# Patient Record
Sex: FEMALE | Race: WHITE | Employment: FULL TIME
[De-identification: names, ages, dates, MRNs, and addresses within clinical notes are randomized per-mention and may not be internally consistent; named-entity substitution may affect disease eponyms.]

---

## 2017-01-27 ENCOUNTER — OFFICE VISIT (OUTPATIENT)
Dept: INTERNAL MEDICINE | Facility: CLINIC | Age: 62
End: 2017-01-27

## 2017-01-27 VITALS
HEART RATE: 100 BPM | DIASTOLIC BLOOD PRESSURE: 58 MMHG | SYSTOLIC BLOOD PRESSURE: 98 MMHG | BODY MASS INDEX: 30.19 KG/M2 | HEIGHT: 60 IN | TEMPERATURE: 97.8 F | RESPIRATION RATE: 18 BRPM | WEIGHT: 153.8 LBS

## 2017-01-27 DIAGNOSIS — K21.9 GASTROESOPHAGEAL REFLUX DISEASE WITHOUT ESOPHAGITIS: ICD-10-CM

## 2017-01-27 DIAGNOSIS — J32.9 CHRONIC SINUSITIS, UNSPECIFIED LOCATION: Primary | ICD-10-CM

## 2017-01-27 PROCEDURE — 99214 OFFICE O/P EST MOD 30 MIN: CPT | Performed by: NURSE PRACTITIONER

## 2017-01-27 RX ORDER — M-VIT,TX,IRON,MINS/CALC/FOLIC 27MG-0.4MG
1 TABLET ORAL DAILY
COMMUNITY

## 2017-01-27 RX ORDER — LORATADINE AND PSEUDOEPHEDRINE 10; 240 MG/1; MG/1
1 TABLET, EXTENDED RELEASE ORAL DAILY
Qty: 30 TABLET | Refills: 5 | Status: SHIPPED | OUTPATIENT
Start: 2017-01-27 | End: 2017-10-05 | Stop reason: SDUPTHER

## 2017-01-27 RX ORDER — MOMETASONE FUROATE 50 UG/1
2 SPRAY, METERED NASAL DAILY
Qty: 1 INHALER | Refills: 3 | Status: SHIPPED | OUTPATIENT
Start: 2017-01-27

## 2017-01-27 RX ORDER — ECHINACEA PURPUREA EXTRACT 125 MG
1 TABLET ORAL PRN
Qty: 1 BOTTLE | Refills: 3 | Status: SHIPPED | OUTPATIENT
Start: 2017-01-27

## 2017-01-27 RX ORDER — BENZONATATE 100 MG/1
100 CAPSULE ORAL 3 TIMES DAILY PRN
Qty: 60 CAPSULE | Refills: 0 | Status: SHIPPED | OUTPATIENT
Start: 2017-01-27 | End: 2017-02-03

## 2017-01-27 ASSESSMENT — ENCOUNTER SYMPTOMS
VOMITING: 0
CONSTIPATION: 0
SHORTNESS OF BREATH: 0
SORE THROAT: 0
NAUSEA: 0
DIARRHEA: 0
COUGH: 1
SINUS PRESSURE: 0
WHEEZING: 0
BLOOD IN STOOL: 0
TROUBLE SWALLOWING: 0
ABDOMINAL PAIN: 0
SINUS PAIN: 1

## 2017-07-19 LAB
CHOLESTEROL, TOTAL: 247 MG/DL
CHOLESTEROL/HDL RATIO: 4.3
HDLC SERPL-MCNC: 58 MG/DL (ref 35–70)
LDL CHOLESTEROL CALCULATED: 164 MG/DL (ref 0–160)
TRIGL SERPL-MCNC: 126 MG/DL
VLDLC SERPL CALC-MCNC: 25 MG/DL

## 2017-10-05 DIAGNOSIS — J32.9 CHRONIC SINUSITIS, UNSPECIFIED LOCATION: ICD-10-CM

## 2017-10-05 RX ORDER — LORATADINE AND PSEUDOEPHEDRINE SULFATE 10; 240 MG/1; MG/1
TABLET, EXTENDED RELEASE ORAL
Qty: 30 TABLET | Refills: 4 | Status: SHIPPED | OUTPATIENT
Start: 2017-10-05

## 2017-10-05 NOTE — TELEPHONE ENCOUNTER
Health Maintenance   Topic Date Due    Hepatitis C screen  1955    HIV screen  08/17/1972    DTaP/Tdap/Td vaccine (1 - Tdap) 08/17/1976    Diabetes screen  08/17/1997    Breast cancer screen  02/26/2017    Cervical cancer screen  03/01/2017    Zostavax vaccine  08/17/2017    Flu vaccine (1) 09/01/2017    Colon cancer screen colonoscopy  03/01/2020    Lipid screen  07/19/2022             (applicable per patient's age: Cancer Screenings, Depression Screening, Fall Risk Screening, Immunizations)    LDL Calculated (mg/dL)   Date Value   07/19/2017 164 (A)      (goal A1C is < 7)   (goal LDL is <100) need 30-50% reduction from baseline     BP Readings from Last 3 Encounters:   01/27/17 (!) 98/58   06/01/16 100/68   02/18/15 110/70    (goal /80)      All Future Testing planned in [facility]:      Next Visit Date:  No future appointments.          Patient Active Problem List:     Sinusitis, chronic     Depression     Pharyngitis     Sinus complaint